# Patient Record
Sex: FEMALE | Race: WHITE | NOT HISPANIC OR LATINO | Employment: FULL TIME | URBAN - METROPOLITAN AREA
[De-identification: names, ages, dates, MRNs, and addresses within clinical notes are randomized per-mention and may not be internally consistent; named-entity substitution may affect disease eponyms.]

---

## 2017-06-12 ENCOUNTER — APPOINTMENT (OUTPATIENT)
Dept: WOMENS IMAGING | Facility: HOSPITAL | Age: 47
End: 2017-06-12

## 2017-06-12 PROCEDURE — 77063 BREAST TOMOSYNTHESIS BI: CPT | Performed by: RADIOLOGY

## 2017-06-12 PROCEDURE — G0202 SCR MAMMO BI INCL CAD: HCPCS | Performed by: RADIOLOGY

## 2017-06-12 PROCEDURE — 77067 SCR MAMMO BI INCL CAD: CPT | Performed by: RADIOLOGY

## 2018-09-17 ENCOUNTER — APPOINTMENT (OUTPATIENT)
Dept: WOMENS IMAGING | Facility: HOSPITAL | Age: 48
End: 2018-09-17

## 2018-09-17 PROCEDURE — 77063 BREAST TOMOSYNTHESIS BI: CPT | Performed by: RADIOLOGY

## 2018-09-17 PROCEDURE — 77067 SCR MAMMO BI INCL CAD: CPT | Performed by: RADIOLOGY

## 2018-12-20 ENCOUNTER — OFFICE VISIT (OUTPATIENT)
Dept: GASTROENTEROLOGY | Facility: CLINIC | Age: 48
End: 2018-12-20

## 2018-12-20 VITALS
BODY MASS INDEX: 40.33 KG/M2 | SYSTOLIC BLOOD PRESSURE: 128 MMHG | HEIGHT: 63 IN | WEIGHT: 227.6 LBS | DIASTOLIC BLOOD PRESSURE: 70 MMHG

## 2018-12-20 DIAGNOSIS — Z12.11 ENCOUNTER FOR SCREENING FOR MALIGNANT NEOPLASM OF COLON: Primary | ICD-10-CM

## 2018-12-20 PROCEDURE — S0285 CNSLT BEFORE SCREEN COLONOSC: HCPCS | Performed by: INTERNAL MEDICINE

## 2018-12-20 RX ORDER — AMLODIPINE BESYLATE 5 MG/1
TABLET ORAL
COMMUNITY
Start: 2018-12-03 | End: 2019-01-04

## 2018-12-20 RX ORDER — SODIUM, POTASSIUM,MAG SULFATES 17.5-3.13G
1 SOLUTION, RECONSTITUTED, ORAL ORAL EVERY 12 HOURS
Qty: 2 BOTTLE | Refills: 0 | Status: ON HOLD | OUTPATIENT
Start: 2018-12-20 | End: 2019-01-07

## 2018-12-20 RX ORDER — LOSARTAN POTASSIUM 50 MG/1
TABLET ORAL
Status: ON HOLD | COMMUNITY
Start: 2018-11-10 | End: 2019-01-07

## 2018-12-20 RX ORDER — ERGOCALCIFEROL 1.25 MG/1
CAPSULE ORAL
COMMUNITY
Start: 2018-11-05

## 2018-12-20 NOTE — PROGRESS NOTES
"    PATIENT INFORMATION  Farrah Nice       - 1970    CHIEF COMPLAINT  Chief Complaint   Patient presents with   • Colonoscopy       HISTORY OF PRESENT ILLNESS  HPI    She is here to discuss cls. No change in bowel habits or blood in stool.. She had noticed white material stools and pcp did stool cx and ova and parasites all which were normal.  No family history of cls.  Last cls was in   REVIEW OF SYSTEMS  Review of Systems   All other systems reviewed and are negative.        ACTIVE PROBLEMS  There are no active problems to display for this patient.        PAST MEDICAL HISTORY  Past Medical History:   Diagnosis Date   • Disease of thyroid gland    • Graves disease    • Hypertension          SURGICAL HISTORY  Past Surgical History:   Procedure Laterality Date   •  SECTION     • COLONOSCOPY           FAMILY HISTORY  Family History   Problem Relation Age of Onset   • Colon cancer Neg Hx    • Colon polyps Neg Hx          SOCIAL HISTORY  Social History     Occupational History   • Not on file   Tobacco Use   • Smoking status: Never Smoker   Substance and Sexual Activity   • Alcohol use: Not on file   • Drug use: Not on file   • Sexual activity: Not on file       Debilities/Disabilities Identified: None    Emotional Behavior: Appropriate    CURRENT MEDICATIONS    Current Outpatient Medications:   •  amLODIPine (NORVASC) 5 MG tablet, , Disp: , Rfl:   •  hydrOXYzine (ATARAX) 25 MG tablet, Take 1 tablet by mouth every 6 (six) hours as needed for anxiety., Disp: 20 tablet, Rfl: 0  •  levothyroxine (SYNTHROID, LEVOTHROID) 200 MCG tablet, Take 200 mcg by mouth daily., Disp: , Rfl:   •  losartan (COZAAR) 50 MG tablet, , Disp: , Rfl:   •  vitamin D (ERGOCALCIFEROL) 19324 units capsule capsule, , Disp: , Rfl:     ALLERGIES  Patient has no known allergies.    VITALS  Vitals:    18 1321   BP: 128/70   Weight: 103 kg (227 lb 9.6 oz)   Height: 160 cm (63\")       LAST RESULTS   Admission on 2016, " Discharged on 06/28/2016   Component Date Value Ref Range Status   • Glucose 06/28/2016 92  65 - 99 mg/dL Final   • BUN 06/28/2016 9  6 - 20 mg/dL Final   • Creatinine 06/28/2016 0.92  0.57 - 1.00 mg/dL Final   • Sodium 06/28/2016 141  136 - 145 mmol/L Final   • Potassium 06/28/2016 3.8  3.5 - 5.2 mmol/L Final   • Chloride 06/28/2016 104  98 - 107 mmol/L Final   • CO2 06/28/2016 25.8  22.0 - 29.0 mmol/L Final   • Calcium 06/28/2016 9.0  8.6 - 10.5 mg/dL Final   • Total Protein 06/28/2016 7.2  6.0 - 8.5 g/dL Final   • Albumin 06/28/2016 4.10  3.50 - 5.20 g/dL Final   • ALT (SGPT) 06/28/2016 20  5 - 33 U/L Final   • AST (SGOT) 06/28/2016 18  5 - 32 U/L Final   • Alkaline Phosphatase 06/28/2016 68  40 - 129 U/L Final   • Total Bilirubin 06/28/2016 0.4  0.2 - 1.2 mg/dL Final   • eGFR Non African Amer 06/28/2016 65  >60 mL/min/1.73 Final   • Globulin 06/28/2016 3.1  gm/dL Final   • A/G Ratio 06/28/2016 1.3  g/dL Final   • BUN/Creatinine Ratio 06/28/2016 9.8  7.0 - 25.0 Final   • Anion Gap 06/28/2016 11.2  mmol/L Final   • proBNP 06/28/2016 55.3  5.0 - 125.0 pg/mL Final   • Troponin T 06/28/2016 <0.010  0.000 - 0.030 ng/mL Final   • HCG Qualitative 06/28/2016 Negative  Negative Final   • Magnesium 06/28/2016 2.1  1.7 - 2.5 mg/dL Final   • Free T4 06/28/2016 1.83* 0.93 - 1.70 ng/dL Final   • TSH 06/28/2016 0.201* 0.270 - 4.200 mIU/mL Final   • Extra Tube 06/28/2016 hold for add-on   Final    Auto resulted   • Extra Tube 06/28/2016 hold for add-on   Final    Auto resulted   • Extra Tube 06/28/2016 Hold for add-ons.   Final    Auto resulted.   • WBC 06/28/2016 5.48  4.80 - 10.80 10*3/mm3 Final   • RBC 06/28/2016 3.92* 4.20 - 5.40 10*6/mm3 Final   • Hemoglobin 06/28/2016 11.7* 12.0 - 16.0 g/dL Final   • Hematocrit 06/28/2016 35.3* 37.0 - 47.0 % Final   • MCV 06/28/2016 90.1  81.0 - 99.0 fL Final   • MCH 06/28/2016 29.8  27.0 - 31.0 pg Final   • MCHC 06/28/2016 33.1  31.0 - 37.0 g/dL Final   • RDW 06/28/2016 13.0  11.5 - 14.5  % Final   • RDW-SD 06/28/2016 42.4  37.0 - 54.0 fl Final   • MPV 06/28/2016 10.4  7.4 - 10.4 fL Final   • Platelets 06/28/2016 220  140 - 500 10*3/mm3 Final   • Neutrophil % 06/28/2016 66.6  45.0 - 70.0 % Final   • Lymphocyte % 06/28/2016 26.1  20.0 - 45.0 % Final   • Monocyte % 06/28/2016 5.3  3.0 - 8.0 % Final   • Eosinophil % 06/28/2016 1.3  0.0 - 4.0 % Final   • Basophil % 06/28/2016 0.5  0.0 - 2.0 % Final   • Immature Grans % 06/28/2016 0.2  0.0 - 0.5 % Final   • Neutrophils, Absolute 06/28/2016 3.65  1.50 - 8.30 10*3/mm3 Final   • Lymphocytes, Absolute 06/28/2016 1.43  0.60 - 4.80 10*3/mm3 Final   • Monocytes, Absolute 06/28/2016 0.29  0.00 - 1.00 10*3/mm3 Final   • Eosinophils, Absolute 06/28/2016 0.07* 0.10 - 0.30 10*3/mm3 Final   • Basophils, Absolute 06/28/2016 0.03  0.00 - 0.20 10*3/mm3 Final   • Immature Grans, Absolute 06/28/2016 0.01  0.00 - 0.03 10*3/mm3 Final   • nRBC 06/28/2016 0.0  0.0 - 0.0 /100 WBC Final     No results found.    PHYSICAL EXAM  Physical Exam   Constitutional: She is oriented to person, place, and time. She appears well-developed and well-nourished. No distress.   HENT:   Head: Normocephalic and atraumatic.   Mouth/Throat: Oropharynx is clear and moist.   Eyes: EOM are normal. Pupils are equal, round, and reactive to light.   Neck: Normal range of motion. No tracheal deviation present.   Cardiovascular: Normal rate, regular rhythm, normal heart sounds and intact distal pulses. Exam reveals no gallop and no friction rub.   No murmur heard.  Pulmonary/Chest: Effort normal and breath sounds normal. No stridor. No respiratory distress. She has no wheezes. She has no rales. She exhibits no tenderness.   Abdominal: Soft. Bowel sounds are normal. She exhibits no distension. There is no tenderness. There is no rebound and no guarding.   Musculoskeletal: She exhibits no edema.   Lymphadenopathy:     She has no cervical adenopathy.   Neurological: She is alert and oriented to person, place,  and time.   Skin: Skin is warm. She is not diaphoretic.   Psychiatric: She has a normal mood and affect. Her behavior is normal. Judgment and thought content normal.   Nursing note and vitals reviewed.      ASSESSMENT  Diagnoses and all orders for this visit:    Encounter for screening for malignant neoplasm of colon    Other orders  -     amLODIPine (NORVASC) 5 MG tablet;   -     vitamin D (ERGOCALCIFEROL) 05838 units capsule capsule;   -     losartan (COZAAR) 50 MG tablet;           PLAN  No Follow-up on file.    Indications, risks and procedure were discussed with the patient, including but not limited to, bleeding, infection, possibility of perforation and possible polypectomy. All of the patient's questions were answered, and signed informed consent was obtained and placed on the chart.

## 2018-12-21 PROBLEM — Z12.11 ENCOUNTER FOR SCREENING FOR MALIGNANT NEOPLASM OF COLON: Status: ACTIVE | Noted: 2018-12-21

## 2019-01-04 ENCOUNTER — ANESTHESIA EVENT (OUTPATIENT)
Dept: PERIOP | Facility: HOSPITAL | Age: 49
End: 2019-01-04

## 2019-01-07 ENCOUNTER — HOSPITAL ENCOUNTER (OUTPATIENT)
Facility: HOSPITAL | Age: 49
Setting detail: HOSPITAL OUTPATIENT SURGERY
Discharge: HOME | End: 2019-01-07
Attending: INTERNAL MEDICINE | Admitting: INTERNAL MEDICINE

## 2019-01-07 ENCOUNTER — ANESTHESIA (OUTPATIENT)
Dept: PERIOP | Facility: HOSPITAL | Age: 49
End: 2019-01-07

## 2019-01-07 VITALS
RESPIRATION RATE: 16 BRPM | HEIGHT: 63 IN | DIASTOLIC BLOOD PRESSURE: 90 MMHG | WEIGHT: 225.4 LBS | SYSTOLIC BLOOD PRESSURE: 131 MMHG | OXYGEN SATURATION: 100 % | HEART RATE: 90 BPM | BODY MASS INDEX: 39.94 KG/M2 | TEMPERATURE: 98 F

## 2019-01-07 LAB — GLUCOSE BLDC GLUCOMTR-MCNC: 80 MG/DL (ref 70–130)

## 2019-01-07 PROCEDURE — 93005 ELECTROCARDIOGRAM TRACING: CPT | Performed by: NURSE ANESTHETIST, CERTIFIED REGISTERED

## 2019-01-07 PROCEDURE — 45378 DIAGNOSTIC COLONOSCOPY: CPT | Performed by: INTERNAL MEDICINE

## 2019-01-07 PROCEDURE — 93010 ELECTROCARDIOGRAM REPORT: CPT | Performed by: INTERNAL MEDICINE

## 2019-01-07 PROCEDURE — 25010000002 PROPOFOL 10 MG/ML EMULSION: Performed by: NURSE ANESTHETIST, CERTIFIED REGISTERED

## 2019-01-07 PROCEDURE — 82962 GLUCOSE BLOOD TEST: CPT

## 2019-01-07 RX ORDER — GLYCOPYRROLATE 0.2 MG/ML
INJECTION INTRAMUSCULAR; INTRAVENOUS AS NEEDED
Status: DISCONTINUED | OUTPATIENT
Start: 2019-01-07 | End: 2019-01-07 | Stop reason: SURG

## 2019-01-07 RX ORDER — SODIUM CHLORIDE, SODIUM LACTATE, POTASSIUM CHLORIDE, CALCIUM CHLORIDE 600; 310; 30; 20 MG/100ML; MG/100ML; MG/100ML; MG/100ML
9 INJECTION, SOLUTION INTRAVENOUS CONTINUOUS
Status: DISCONTINUED | OUTPATIENT
Start: 2019-01-07 | End: 2019-01-07 | Stop reason: HOSPADM

## 2019-01-07 RX ORDER — SODIUM CHLORIDE 9 MG/ML
40 INJECTION, SOLUTION INTRAVENOUS AS NEEDED
Status: DISCONTINUED | OUTPATIENT
Start: 2019-01-07 | End: 2019-01-07 | Stop reason: HOSPADM

## 2019-01-07 RX ORDER — ONDANSETRON 2 MG/ML
4 INJECTION INTRAMUSCULAR; INTRAVENOUS ONCE AS NEEDED
Status: DISCONTINUED | OUTPATIENT
Start: 2019-01-07 | End: 2019-01-07 | Stop reason: HOSPADM

## 2019-01-07 RX ORDER — SODIUM CHLORIDE 0.9 % (FLUSH) 0.9 %
3 SYRINGE (ML) INJECTION EVERY 12 HOURS SCHEDULED
Status: DISCONTINUED | OUTPATIENT
Start: 2019-01-07 | End: 2019-01-07 | Stop reason: HOSPADM

## 2019-01-07 RX ORDER — SODIUM CHLORIDE, SODIUM LACTATE, POTASSIUM CHLORIDE, CALCIUM CHLORIDE 600; 310; 30; 20 MG/100ML; MG/100ML; MG/100ML; MG/100ML
100 INJECTION, SOLUTION INTRAVENOUS CONTINUOUS
Status: DISCONTINUED | OUTPATIENT
Start: 2019-01-07 | End: 2019-01-07 | Stop reason: HOSPADM

## 2019-01-07 RX ORDER — MEPERIDINE HYDROCHLORIDE 25 MG/ML
12.5 INJECTION INTRAMUSCULAR; INTRAVENOUS; SUBCUTANEOUS
Status: DISCONTINUED | OUTPATIENT
Start: 2019-01-07 | End: 2019-01-07 | Stop reason: HOSPADM

## 2019-01-07 RX ORDER — LIDOCAINE HYDROCHLORIDE 10 MG/ML
0.5 INJECTION, SOLUTION EPIDURAL; INFILTRATION; INTRACAUDAL; PERINEURAL ONCE AS NEEDED
Status: DISCONTINUED | OUTPATIENT
Start: 2019-01-07 | End: 2019-01-07 | Stop reason: HOSPADM

## 2019-01-07 RX ORDER — LIDOCAINE HYDROCHLORIDE 20 MG/ML
INJECTION, SOLUTION INFILTRATION; PERINEURAL AS NEEDED
Status: DISCONTINUED | OUTPATIENT
Start: 2019-01-07 | End: 2019-01-07 | Stop reason: SURG

## 2019-01-07 RX ORDER — SODIUM CHLORIDE 0.9 % (FLUSH) 0.9 %
1-10 SYRINGE (ML) INJECTION AS NEEDED
Status: DISCONTINUED | OUTPATIENT
Start: 2019-01-07 | End: 2019-01-07 | Stop reason: HOSPADM

## 2019-01-07 RX ORDER — PROPOFOL 10 MG/ML
VIAL (ML) INTRAVENOUS AS NEEDED
Status: DISCONTINUED | OUTPATIENT
Start: 2019-01-07 | End: 2019-01-07 | Stop reason: SURG

## 2019-01-07 RX ADMIN — PROPOFOL 50 MG: 10 INJECTION, EMULSION INTRAVENOUS at 13:03

## 2019-01-07 RX ADMIN — PROPOFOL 50 MG: 10 INJECTION, EMULSION INTRAVENOUS at 13:09

## 2019-01-07 RX ADMIN — PROPOFOL 50 MG: 10 INJECTION, EMULSION INTRAVENOUS at 13:05

## 2019-01-07 RX ADMIN — GLYCOPYRROLATE 0.2 MG: 0.2 INJECTION INTRAMUSCULAR; INTRAVENOUS at 12:41

## 2019-01-07 RX ADMIN — PROPOFOL 50 MG: 10 INJECTION, EMULSION INTRAVENOUS at 13:01

## 2019-01-07 RX ADMIN — PROPOFOL 50 MG: 10 INJECTION, EMULSION INTRAVENOUS at 12:58

## 2019-01-07 RX ADMIN — LIDOCAINE HYDROCHLORIDE 100 MG: 20 INJECTION, SOLUTION INFILTRATION; PERINEURAL at 12:56

## 2019-01-07 RX ADMIN — PROPOFOL 100 MG: 10 INJECTION, EMULSION INTRAVENOUS at 12:56

## 2019-01-07 RX ADMIN — SODIUM CHLORIDE, POTASSIUM CHLORIDE, SODIUM LACTATE AND CALCIUM CHLORIDE: 600; 310; 30; 20 INJECTION, SOLUTION INTRAVENOUS at 12:16

## 2019-01-07 NOTE — ANESTHESIA POSTPROCEDURE EVALUATION
Patient: Farrah Niec    Procedure Summary     Date:  01/07/19 Room / Location:  [facility] ENDOSCOPY 2 /  [facility]    Anesthesia Start:  1240 Anesthesia Stop:  1314    Procedure:  COLONOSCOPY (N/A ) Diagnosis:       Encounter for screening for malignant neoplasm of colon      (Encounter for screening for malignant neoplasm of colon [Z12.11])    Surgeon:  Milagros Wilson MD Provider:  Leandro Carnes CRNA    Anesthesia Type:  MAC ASA Status:  2          Anesthesia Type: MAC  Last vitals  BP   147/87 (01/07/19 1224)   Temp   (P) 98 °F (36.7 °C) (01/07/19 1315)   Pulse   (P) 90 (01/07/19 1315)   Resp   (P) 16 (01/07/19 1315)     SpO2   (P) 98 % (01/07/19 1315)     Post Anesthesia Care and Evaluation    Patient location during evaluation: PHASE II  Patient participation: complete - patient participated  Level of consciousness: awake and alert  Pain score: 2  Pain management: satisfactory to patient  Airway patency: patent  Anesthetic complications: No anesthetic complications  PONV Status: none  Cardiovascular status: acceptable  Respiratory status: acceptable  Hydration status: acceptable    Comments: 1/7/19 1340  130/91  81  99% RA  RR 12

## 2019-01-07 NOTE — ANESTHESIA PREPROCEDURE EVALUATION
Anesthesia Evaluation     Patient summary reviewed and Nursing notes reviewed   NPO Solid Status: > 8 hours  NPO Liquid Status: > 2 hours           Airway   Mallampati: I  TM distance: >3 FB  Neck ROM: full  No difficulty expected  Dental - normal exam     Pulmonary - negative pulmonary ROS and normal exam   Cardiovascular - normal exam    (+) hypertension less than 2 medications,       Neuro/Psych- negative ROS  GI/Hepatic/Renal/Endo    (+) obesity,   hypothyroidism, hyperthyroidism (graves)    Musculoskeletal (-) negative ROS    Abdominal   (+) obese,    Substance History - negative use     OB/GYN          Other                        Anesthesia Plan    ASA 2     MAC     intravenous induction   Anesthetic plan, all risks, benefits, and alternatives have been provided, discussed and informed consent has been obtained with: patient.

## 2019-01-07 NOTE — OP NOTE
Patient Name:  Farrah Nice  YOB: 1970    Date of Procedure:  1/7/2019    Procedure Performed:  colonoscopy   Indications:  Screening colonoscopy    Pre-op Diagnosis:   Encounter for screening for malignant neoplasm of colon [Z12.11]    Post-Op Diagnosis Codes:     * Encounter for screening for malignant neoplasm of colon [Z12.11]         Staff:  Surgeon(s):  Milagros Wilson MD         Consent: Procedure colonoscopy Indications, risks and procedure were discussed with the patient, including but not limited to, bleeding, infection, possibility of perforation and possible polypectomy. All of the patient's questions were answered, and signed informed consent was obtained and placed on the chart.      Sedation: Sedation was provided by anesthesia.      Estimated Blood Loss: none    Specimens: None      Description of Procedure:   After excellent sedation a digital rectal examination is performed and a flexible colonoscope was inserted into the rectum passed to the cecum.  Cecum was identified by both the ileocecal valve and the appendiceal orifice.  The overall bowel preparation was good.  Terminal ileum was entered this was normal.  Upon withdrawal scope careful examination of the mucosa was made.  No polyps are noted.  The scope was then slowly withdrawn to the rectum and retroflex were internal hemorrhoids are noted.  The scope was then straightened and withdrawn out of the patient with no immediate complications and she tolerated the procedure well.      Findings:   Internal hemorrhoids  No polyps  She can keep her screening at every 10 years    Complications: None        Milagros Wilson MD     Date: 1/7/2019  Time: 1:18 PM

## 2019-01-07 NOTE — H&P
- 1970     CHIEF COMPLAINT      Chief Complaint   Patient presents with   • Colonoscopy         HISTORY OF PRESENT ILLNESS  HPI     She is here to discuss cls. No change in bowel habits or blood in stool.. She had noticed white material stools and pcp did stool cx and ova and parasites all which were normal.  No family history of cls.  Last cls was in   REVIEW OF SYSTEMS  Review of Systems   All other systems reviewed and are negative.           ACTIVE PROBLEMS  There are no active problems to display for this patient.           PAST MEDICAL HISTORY  Medical History        Past Medical History:   Diagnosis Date   • Disease of thyroid gland     • Graves disease     • Hypertension                 SURGICAL HISTORY  Surgical History         Past Surgical History:   Procedure Laterality Date   •  SECTION       • COLONOSCOPY                  FAMILY HISTORY        Family History   Problem Relation Age of Onset   • Colon cancer Neg Hx     • Colon polyps Neg Hx              SOCIAL HISTORY  Social History           Occupational History   • Not on file   Tobacco Use   • Smoking status: Never Smoker   Substance and Sexual Activity   • Alcohol use: Not on file   • Drug use: Not on file   • Sexual activity: Not on file         Debilities/Disabilities Identified: None     Emotional Behavior: Appropriate     CURRENT MEDICATIONS     Current Outpatient Medications:   •  amLODIPine (NORVASC) 5 MG tablet, , Disp: , Rfl:   •  hydrOXYzine (ATARAX) 25 MG tablet, Take 1 tablet by mouth every 6 (six) hours as needed for anxiety., Disp: 20 tablet, Rfl: 0  •  levothyroxine (SYNTHROID, LEVOTHROID) 200 MCG tablet, Take 200 mcg by mouth daily., Disp: , Rfl:   •  losartan (COZAAR) 50 MG tablet, , Disp: , Rfl:   •  vitamin D (ERGOCALCIFEROL) 10597 units capsule capsule, , Disp: , Rfl:      ALLERGIES  Patient has no known allergies.     VITALS  /87 (BP Location: Left arm, Patient Position: Sitting)   Pulse 82    "Temp 98.1 °F (36.7 °C) (Oral)   Resp 16   Ht 160 cm (62.99\")   Wt 102 kg (225 lb 6.4 oz)   LMP 01/01/2019   SpO2 97%   Breastfeeding? No   BMI 39.94 kg/m²               LAST RESULTS                   Admission on 06/28/2016, Discharged on 06/28/2016   Component Date Value Ref Range Status   • Glucose 06/28/2016 92  65 - 99 mg/dL Final   • BUN 06/28/2016 9  6 - 20 mg/dL Final   • Creatinine 06/28/2016 0.92  0.57 - 1.00 mg/dL Final   • Sodium 06/28/2016 141  136 - 145 mmol/L Final   • Potassium 06/28/2016 3.8  3.5 - 5.2 mmol/L Final   • Chloride 06/28/2016 104  98 - 107 mmol/L Final   • CO2 06/28/2016 25.8  22.0 - 29.0 mmol/L Final   • Calcium 06/28/2016 9.0  8.6 - 10.5 mg/dL Final   • Total Protein 06/28/2016 7.2  6.0 - 8.5 g/dL Final   • Albumin 06/28/2016 4.10  3.50 - 5.20 g/dL Final   • ALT (SGPT) 06/28/2016 20  5 - 33 U/L Final   • AST (SGOT) 06/28/2016 18  5 - 32 U/L Final   • Alkaline Phosphatase 06/28/2016 68  40 - 129 U/L Final   • Total Bilirubin 06/28/2016 0.4  0.2 - 1.2 mg/dL Final   • eGFR Non African Amer 06/28/2016 65  >60 mL/min/1.73 Final   • Globulin 06/28/2016 3.1  gm/dL Final   • A/G Ratio 06/28/2016 1.3  g/dL Final   • BUN/Creatinine Ratio 06/28/2016 9.8  7.0 - 25.0 Final   • Anion Gap 06/28/2016 11.2  mmol/L Final   • proBNP 06/28/2016 55.3  5.0 - 125.0 pg/mL Final   • Troponin T 06/28/2016 <0.010  0.000 - 0.030 ng/mL Final   • HCG Qualitative 06/28/2016 Negative  Negative Final   • Magnesium 06/28/2016 2.1  1.7 - 2.5 mg/dL Final   • Free T4 06/28/2016 1.83* 0.93 - 1.70 ng/dL Final   • TSH 06/28/2016 0.201* 0.270 - 4.200 mIU/mL Final   • Extra Tube 06/28/2016 hold for add-on    Final     Auto resulted   • Extra Tube 06/28/2016 hold for add-on    Final     Auto resulted   • Extra Tube 06/28/2016 Hold for add-ons.    Final     Auto resulted.   • WBC 06/28/2016 5.48  4.80 - 10.80 10*3/mm3 Final   • RBC 06/28/2016 3.92* 4.20 - 5.40 10*6/mm3 Final   • Hemoglobin 06/28/2016 11.7* 12.0 - 16.0 " g/dL Final   • Hematocrit 06/28/2016 35.3* 37.0 - 47.0 % Final   • MCV 06/28/2016 90.1  81.0 - 99.0 fL Final   • MCH 06/28/2016 29.8  27.0 - 31.0 pg Final   • MCHC 06/28/2016 33.1  31.0 - 37.0 g/dL Final   • RDW 06/28/2016 13.0  11.5 - 14.5 % Final   • RDW-SD 06/28/2016 42.4  37.0 - 54.0 fl Final   • MPV 06/28/2016 10.4  7.4 - 10.4 fL Final   • Platelets 06/28/2016 220  140 - 500 10*3/mm3 Final   • Neutrophil % 06/28/2016 66.6  45.0 - 70.0 % Final   • Lymphocyte % 06/28/2016 26.1  20.0 - 45.0 % Final   • Monocyte % 06/28/2016 5.3  3.0 - 8.0 % Final   • Eosinophil % 06/28/2016 1.3  0.0 - 4.0 % Final   • Basophil % 06/28/2016 0.5  0.0 - 2.0 % Final   • Immature Grans % 06/28/2016 0.2  0.0 - 0.5 % Final   • Neutrophils, Absolute 06/28/2016 3.65  1.50 - 8.30 10*3/mm3 Final   • Lymphocytes, Absolute 06/28/2016 1.43  0.60 - 4.80 10*3/mm3 Final   • Monocytes, Absolute 06/28/2016 0.29  0.00 - 1.00 10*3/mm3 Final   • Eosinophils, Absolute 06/28/2016 0.07* 0.10 - 0.30 10*3/mm3 Final   • Basophils, Absolute 06/28/2016 0.03  0.00 - 0.20 10*3/mm3 Final   • Immature Grans, Absolute 06/28/2016 0.01  0.00 - 0.03 10*3/mm3 Final   • nRBC 06/28/2016 0.0  0.0 - 0.0 /100 WBC Final      No results found.     PHYSICAL EXAM  Physical Exam   Constitutional: She is oriented to person, place, and time. She appears well-developed and well-nourished. No distress.   HENT:   Head: Normocephalic and atraumatic.   Mouth/Throat: Oropharynx is clear and moist.   Eyes: EOM are normal. Pupils are equal, round, and reactive to light.   Neck: Normal range of motion. No tracheal deviation present.   Cardiovascular: Normal rate, regular rhythm, normal heart sounds and intact distal pulses. Exam reveals no gallop and no friction rub.   No murmur heard.  Pulmonary/Chest: Effort normal and breath sounds normal. No stridor. No respiratory distress. She has no wheezes. She has no rales. She exhibits no tenderness.   Abdominal: Soft. Bowel sounds are normal. She  exhibits no distension. There is no tenderness. There is no rebound and no guarding.   Musculoskeletal: She exhibits no edema.   Lymphadenopathy:     She has no cervical adenopathy.   Neurological: She is alert and oriented to person, place, and time.   Skin: Skin is warm. She is not diaphoretic.   Psychiatric: She has a normal mood and affect. Her behavior is normal. Judgment and thought content normal.   Nursing note and vitals reviewed.        ASSESSMENT  Diagnoses and all orders for this visit:     Encounter for screening for malignant neoplasm of colon     Other orders  -     amLODIPine (NORVASC) 5 MG tablet;   -     vitamin D (ERGOCALCIFEROL) 16933 units capsule capsule;   -     losartan (COZAAR) 50 MG tablet;               PLAN  No Follow-up on file.     Indications, risks and procedure were discussed with the patient, including but not limited to, bleeding, infection, possibility of perforation and possible polypectomy. All of the patient's questions were answered, and signed informed consent was obtained and placed on the chart.

## 2019-10-07 ENCOUNTER — APPOINTMENT (OUTPATIENT)
Dept: WOMENS IMAGING | Facility: HOSPITAL | Age: 49
End: 2019-10-07

## 2019-10-07 PROCEDURE — 77067 SCR MAMMO BI INCL CAD: CPT | Performed by: RADIOLOGY

## 2019-10-07 PROCEDURE — 77063 BREAST TOMOSYNTHESIS BI: CPT | Performed by: RADIOLOGY

## 2020-11-16 ENCOUNTER — APPOINTMENT (OUTPATIENT)
Dept: WOMENS IMAGING | Facility: HOSPITAL | Age: 50
End: 2020-11-16

## 2020-11-16 PROCEDURE — 77063 BREAST TOMOSYNTHESIS BI: CPT | Performed by: RADIOLOGY

## 2020-11-16 PROCEDURE — 77067 SCR MAMMO BI INCL CAD: CPT | Performed by: RADIOLOGY

## 2021-11-29 ENCOUNTER — APPOINTMENT (OUTPATIENT)
Dept: WOMENS IMAGING | Facility: HOSPITAL | Age: 51
End: 2021-11-29

## 2021-11-29 PROCEDURE — 77063 BREAST TOMOSYNTHESIS BI: CPT | Performed by: RADIOLOGY

## 2021-11-29 PROCEDURE — 77067 SCR MAMMO BI INCL CAD: CPT | Performed by: RADIOLOGY

## 2022-12-05 ENCOUNTER — APPOINTMENT (OUTPATIENT)
Dept: WOMENS IMAGING | Facility: HOSPITAL | Age: 52
End: 2022-12-05

## 2022-12-05 PROCEDURE — 77067 SCR MAMMO BI INCL CAD: CPT | Performed by: RADIOLOGY

## 2022-12-05 PROCEDURE — 77063 BREAST TOMOSYNTHESIS BI: CPT | Performed by: RADIOLOGY

## 2023-05-24 ENCOUNTER — TELEPHONE (OUTPATIENT)
Dept: URGENT CARE | Facility: CLINIC | Age: 53
End: 2023-05-24
Payer: COMMERCIAL

## 2023-05-24 DIAGNOSIS — H65.03 NON-RECURRENT ACUTE SEROUS OTITIS MEDIA OF BOTH EARS: Primary | ICD-10-CM

## 2023-05-24 RX ORDER — AMOXICILLIN AND CLAVULANATE POTASSIUM 875; 125 MG/1; MG/1
1 TABLET, FILM COATED ORAL 2 TIMES DAILY
Qty: 20 TABLET | Refills: 0 | Status: SHIPPED | OUTPATIENT
Start: 2023-05-24 | End: 2023-06-03

## 2023-12-08 ENCOUNTER — APPOINTMENT (OUTPATIENT)
Dept: WOMENS IMAGING | Facility: HOSPITAL | Age: 53
End: 2023-12-08
Payer: COMMERCIAL

## 2023-12-08 PROCEDURE — 77063 BREAST TOMOSYNTHESIS BI: CPT | Performed by: RADIOLOGY

## 2023-12-08 PROCEDURE — 77067 SCR MAMMO BI INCL CAD: CPT | Performed by: RADIOLOGY

## 2025-01-10 ENCOUNTER — APPOINTMENT (OUTPATIENT)
Dept: WOMENS IMAGING | Facility: HOSPITAL | Age: 55
End: 2025-01-10
Payer: COMMERCIAL

## 2025-01-10 PROCEDURE — 77063 BREAST TOMOSYNTHESIS BI: CPT | Performed by: RADIOLOGY

## 2025-01-10 PROCEDURE — 77067 SCR MAMMO BI INCL CAD: CPT | Performed by: RADIOLOGY

## (undated) DEVICE — SYR LL 3CC

## (undated) DEVICE — SUCTION CANISTER, 3000CC,SAFELINER: Brand: DEROYAL

## (undated) DEVICE — Device

## (undated) DEVICE — MASK,FACE,FLUID RESIST,SHLD,EARLOOP: Brand: MEDLINE

## (undated) DEVICE — ENDO. PORT CONNECTOR W/VALVE FOR OLYMPUS® SCOPES: Brand: ERBE

## (undated) DEVICE — Device: Brand: DEFENDO AIR/WATER/SUCTION AND BIOPSY VALVE

## (undated) DEVICE — GOWN ISOL W/THUMB UNIV BLU BX/15

## (undated) DEVICE — JACKT LAB KNIT COLR LG BLU

## (undated) DEVICE — SPNG GZ WOVN 4X4IN 12PLY 10/BX STRL

## (undated) DEVICE — BW-412T DISP COMBO CLEANING BRUSH: Brand: SINGLE USE COMBINATION CLEANING BRUSH